# Patient Record
Sex: FEMALE | Race: WHITE | NOT HISPANIC OR LATINO | Employment: STUDENT | URBAN - METROPOLITAN AREA
[De-identification: names, ages, dates, MRNs, and addresses within clinical notes are randomized per-mention and may not be internally consistent; named-entity substitution may affect disease eponyms.]

---
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
## 2022-03-15 ENCOUNTER — TELEPHONE (OUTPATIENT)
Dept: FAMILY MEDICINE | Facility: CLINIC | Age: 22
End: 2022-03-15
Payer: COMMERCIAL

## 2022-03-15 NOTE — TELEPHONE ENCOUNTER
----- Message from Sheela Damon MA sent at 3/15/2022  9:35 AM CDT -----  Type: Needs Medical Advice  Who Called:  Paolo Garcia    Best Call Back Number: [phone]  Additional Information: pt is Dr Jayson Carroll' niece--he recommended that pt see Dr Gallo--Dr Carroll' wife, Adriana sees Dr Gallo as well & raves about her--mother would like to see if Dr Gallo would take her on as a New Patient since Dr Carroll is in the [facility] network & gave recommendation?please advise--thank you

## 2022-03-25 ENCOUNTER — OFFICE VISIT (OUTPATIENT)
Dept: FAMILY MEDICINE | Facility: CLINIC | Age: 22
End: 2022-03-25
Payer: COMMERCIAL

## 2022-03-25 VITALS
WEIGHT: 141.13 LBS | HEART RATE: 64 BPM | OXYGEN SATURATION: 99 % | DIASTOLIC BLOOD PRESSURE: 64 MMHG | HEIGHT: 67 IN | RESPIRATION RATE: 18 BRPM | BODY MASS INDEX: 22.15 KG/M2 | SYSTOLIC BLOOD PRESSURE: 96 MMHG

## 2022-03-25 DIAGNOSIS — Z00.00 ROUTINE GENERAL MEDICAL EXAMINATION AT A HEALTH CARE FACILITY: Primary | ICD-10-CM

## 2022-03-25 DIAGNOSIS — F41.9 ANXIETY: ICD-10-CM

## 2022-03-25 PROCEDURE — 1160F PR REVIEW ALL MEDS BY PRESCRIBER/CLIN PHARMACIST DOCUMENTED: ICD-10-PCS | Mod: CPTII,S$GLB,, | Performed by: FAMILY MEDICINE

## 2022-03-25 PROCEDURE — 3078F DIAST BP <80 MM HG: CPT | Mod: CPTII,S$GLB,, | Performed by: FAMILY MEDICINE

## 2022-03-25 PROCEDURE — 99999 PR PBB SHADOW E&M-EST. PATIENT-LVL IV: CPT | Mod: PBBFAC,,, | Performed by: FAMILY MEDICINE

## 2022-03-25 PROCEDURE — 3074F PR MOST RECENT SYSTOLIC BLOOD PRESSURE < 130 MM HG: ICD-10-PCS | Mod: CPTII,S$GLB,, | Performed by: FAMILY MEDICINE

## 2022-03-25 PROCEDURE — 99203 PR OFFICE/OUTPT VISIT, NEW, LEVL III, 30-44 MIN: ICD-10-PCS | Mod: S$GLB,,, | Performed by: FAMILY MEDICINE

## 2022-03-25 PROCEDURE — 99203 OFFICE O/P NEW LOW 30 MIN: CPT | Mod: S$GLB,,, | Performed by: FAMILY MEDICINE

## 2022-03-25 PROCEDURE — 3008F BODY MASS INDEX DOCD: CPT | Mod: CPTII,S$GLB,, | Performed by: FAMILY MEDICINE

## 2022-03-25 PROCEDURE — 3074F SYST BP LT 130 MM HG: CPT | Mod: CPTII,S$GLB,, | Performed by: FAMILY MEDICINE

## 2022-03-25 PROCEDURE — 3008F PR BODY MASS INDEX (BMI) DOCUMENTED: ICD-10-PCS | Mod: CPTII,S$GLB,, | Performed by: FAMILY MEDICINE

## 2022-03-25 PROCEDURE — 3078F PR MOST RECENT DIASTOLIC BLOOD PRESSURE < 80 MM HG: ICD-10-PCS | Mod: CPTII,S$GLB,, | Performed by: FAMILY MEDICINE

## 2022-03-25 PROCEDURE — 1159F PR MEDICATION LIST DOCUMENTED IN MEDICAL RECORD: ICD-10-PCS | Mod: CPTII,S$GLB,, | Performed by: FAMILY MEDICINE

## 2022-03-25 PROCEDURE — 1160F RVW MEDS BY RX/DR IN RCRD: CPT | Mod: CPTII,S$GLB,, | Performed by: FAMILY MEDICINE

## 2022-03-25 PROCEDURE — 99999 PR PBB SHADOW E&M-EST. PATIENT-LVL IV: ICD-10-PCS | Mod: PBBFAC,,, | Performed by: FAMILY MEDICINE

## 2022-03-25 PROCEDURE — 1159F MED LIST DOCD IN RCRD: CPT | Mod: CPTII,S$GLB,, | Performed by: FAMILY MEDICINE

## 2022-03-25 RX ORDER — KETOCONAZOLE 20 MG/G
1 CREAM TOPICAL 2 TIMES DAILY
COMMUNITY
Start: 2021-11-03

## 2022-03-25 RX ORDER — TAZAROTENE 0.45 MG/G
LOTION TOPICAL
COMMUNITY
Start: 2021-11-04

## 2022-03-25 RX ORDER — DOXYCYCLINE HYCLATE 200 MG/1
TABLET, DELAYED RELEASE ORAL
COMMUNITY
Start: 2021-12-27

## 2022-03-25 RX ORDER — KETOCONAZOLE 20 MG/ML
SHAMPOO, SUSPENSION TOPICAL DAILY
COMMUNITY
Start: 2021-11-04

## 2022-03-25 NOTE — PROGRESS NOTES
Subjective:       Patient ID: Hiwot Talbot is a 21 y.o. female.    Chief Complaint: Fatigue (Severe fatigue has been going on for a year, sx for 4 years, anxiety and stress related to this, cycle irregular, migraines until cycle slowed down)      Hiwot Talbot is in the office to establish care.    HPI  Medical hx reviewed.  Past Medical History:   Diagnosis Date    Vision abnormalities     pt wears contacts     Recalls hx of migraines, last >1 year ago. No meds other than otcs for general occ tension HA.   Fatigue: both mental and physical. Finds herself falling asleep throughout the day. +naps throughout the day. Trouble focusing. Initially noticed this last year.    Recalls ongoing c/o fatigue since age 16. Her boss has mentioned her needing eval for ease of falling asleep.   More tired during the week, but still requiring a nap on the weekends. Naps can go on for several hours (up to 5). Nighttime rest 6-7hrs, does not feel rested when she wakes. She does get more sleep on the weekends, 30 mins to an hour more.   +restless sleep. Some vivid dreams or nightmares. No recent issues with sleep paralysis. +light snore sometimes. No change to fatigue related to cycles.  Uses caffeine to help stay awake (coffee am and mid-day). Eats 3 meals/day. (mom notes that she's a healthy eater).   They recall lab was updated 2 years ago anticipating accutane use.   Exercises near-daily. Fatigue can affect this more recently. She finds that she doesn't want to, but has more physical fatigue related to this as well. Does not affect weight-bearing exercises.    Irregular cycles, some months can have 2 cycles. Started off this way, did ok for a while, and then last year started with more irregularity. (missed period or very light).   Weight has been stable. Not yet interested in ocps.     +anxiety. In school, but not sure what she wants to do with her life. A lot of pressure on herself. Almost a full year of noticing this.  +sleep. +tearfulness.    Enjoys training her puppy.     Current Outpatient Medications:     ARAZLO 0.045 % Lotn, , Disp: , Rfl:     doxycycline hyclate (DORYX) 200 mg EC tablet, , Disp: , Rfl:     ketoconazole (NIZORAL) 2 % cream, 1 application 2 (two) times daily. Apply to rash, Disp: , Rfl:     ketoconazole (NIZORAL) 2 % shampoo, Apply topically once daily., Disp: , Rfl:     The ASCVD Risk score (Morehouse RICK Jr., et al., 2013) failed to calculate for the following reasons:    The 2013 ASCVD risk score is only valid for ages 40 to 79     No results found for: HGBA1C  No results found for: GLUF, MICROALBUR, LDLCALC, CREATININE    Review of Systems   Constitutional: Positive for fatigue. Negative for unexpected weight change.   HENT: Negative for congestion and postnasal drip.    Respiratory: Negative for cough and shortness of breath.    Cardiovascular: Negative for chest pain and palpitations.   Gastrointestinal: Negative for blood in stool, constipation, diarrhea and nausea (occ after meals).   Genitourinary: Negative for difficulty urinating, dysuria and frequency.   Musculoskeletal: Negative for arthralgias, gait problem and myalgias.   Neurological: Negative for dizziness and headaches.   Psychiatric/Behavioral: Positive for dysphoric mood and sleep disturbance. The patient is nervous/anxious.        Objective:      Physical Exam  Vitals and nursing note reviewed.   Constitutional:       General: She is not in acute distress.     Appearance: Normal appearance. She is well-developed.   HENT:      Head: Normocephalic and atraumatic.      Right Ear: Tympanic membrane and external ear normal.      Left Ear: Tympanic membrane and external ear normal.      Nose: Nose normal.      Mouth/Throat:      Pharynx: No oropharyngeal exudate.   Eyes:      Conjunctiva/sclera: Conjunctivae normal.      Pupils: Pupils are equal, round, and reactive to light.   Neck:      Thyroid: No thyromegaly.   Cardiovascular:      Rate and  Rhythm: Normal rate and regular rhythm.   Pulmonary:      Effort: Pulmonary effort is normal. No respiratory distress.      Breath sounds: Normal breath sounds. No wheezing.   Abdominal:      General: Bowel sounds are normal. There is no distension.      Palpations: Abdomen is soft. There is no mass.      Tenderness: There is no abdominal tenderness. There is no guarding or rebound.   Musculoskeletal:         General: Normal range of motion.      Cervical back: Normal range of motion and neck supple.      Right lower leg: No edema.      Left lower leg: No edema.   Lymphadenopathy:      Cervical: No cervical adenopathy.   Skin:     General: Skin is warm and dry.   Neurological:      General: No focal deficit present.      Mental Status: She is alert and oriented to person, place, and time.      Cranial Nerves: No cranial nerve deficit.   Psychiatric:         Mood and Affect: Mood normal.         Behavior: Behavior normal.             Screening recommendations appropriate to age and health status were reviewed.    Assessment & Plan:    Routine general medical examination at a health care facility  Comments:  anticipatory guidance reviewed  Orders:  -     Cancel: CBC Without Differential; Future; Expected date: 03/25/2022  -     Cancel: Comprehensive Metabolic Panel; Future; Expected date: 03/25/2022  -     Cancel: Folate; Future; Expected date: 03/25/2022  -     Cancel: Ferritin; Future; Expected date: 03/25/2022  -     Cancel: Iron and TIBC; Future; Expected date: 03/25/2022  -     Cancel: Lipid Panel; Future; Expected date: 03/25/2022  -     Cancel: TSH; Future; Expected date: 03/25/2022  -     Cancel: Vitamin B12; Future; Expected date: 03/25/2022  -     Cancel: Urinalysis, Reflex to Urine Culture Urine, Clean Catch; Future  -     Cancel: Hepatitis C Antibody; Future; Expected date: 03/25/2022  -     Cancel: HIV 1/2 Ag/Ab (4th Gen); Future; Expected date: 03/25/2022  -     HIV 1/2 Ag/Ab (4th Gen); Future; Expected  date: 03/25/2022  -     Hepatitis C Antibody; Future; Expected date: 03/25/2022  -     Vitamin B12; Future; Expected date: 03/25/2022  -     TSH; Future; Expected date: 03/25/2022  -     Lipid Panel; Future; Expected date: 03/25/2022  -     Iron and TIBC; Future; Expected date: 03/25/2022  -     Ferritin; Future; Expected date: 03/25/2022  -     Folate; Future; Expected date: 03/25/2022  -     Comprehensive Metabolic Panel; Future; Expected date: 03/25/2022  -     CBC Without Differential; Future; Expected date: 03/25/2022  -     Cancel: Urinalysis, Reflex to Urine Culture Urine, Clean Catch; Future  -     Urinalysis, Reflex to Urine Culture Urine, Clean Catch; Future    Anxiety  -     Ambulatory referral/consult to Psychology; Future; Expected date: 04/01/2022

## 2022-03-29 ENCOUNTER — PATIENT MESSAGE (OUTPATIENT)
Dept: FAMILY MEDICINE | Facility: CLINIC | Age: 22
End: 2022-03-29
Payer: COMMERCIAL

## 2022-03-29 DIAGNOSIS — R79.0 ABNORMAL IRON SATURATION: Primary | ICD-10-CM

## 2022-03-30 LAB
ALBUMIN SERPL-MCNC: 4.2 G/DL (ref 3.6–5.1)
ALBUMIN/GLOB SERPL: 1.9 (CALC) (ref 1–2.5)
ALP SERPL-CCNC: 66 U/L (ref 36–128)
ALT SERPL-CCNC: 11 U/L (ref 5–32)
AST SERPL-CCNC: 17 U/L (ref 12–32)
BILIRUB SERPL-MCNC: 0.6 MG/DL (ref 0.2–1.1)
BUN SERPL-MCNC: 14 MG/DL (ref 7–20)
BUN/CREAT SERPL: NORMAL (CALC) (ref 6–22)
CALCIUM SERPL-MCNC: 9.3 MG/DL (ref 8.9–10.4)
CHLORIDE SERPL-SCNC: 106 MMOL/L (ref 98–110)
CHOLEST SERPL-MCNC: 119 MG/DL
CHOLEST/HDLC SERPL: 2.2 (CALC)
CO2 SERPL-SCNC: 27 MMOL/L (ref 20–32)
CREAT SERPL-MCNC: 0.64 MG/DL (ref 0.5–1)
ERYTHROCYTE [DISTWIDTH] IN BLOOD BY AUTOMATED COUNT: 11.9 % (ref 11–15)
FERRITIN SERPL-MCNC: 30 NG/ML (ref 16–154)
FOLATE SERPL-MCNC: 15.2 NG/ML
GLOBULIN SER CALC-MCNC: 2.2 G/DL (CALC) (ref 2–3.8)
GLUCOSE SERPL-MCNC: 82 MG/DL (ref 65–99)
HCT VFR BLD AUTO: 40.9 % (ref 35–45)
HCV AB S/CO SERPL IA: 0.01
HCV AB SERPL QL IA: NORMAL
HDLC SERPL-MCNC: 55 MG/DL
HGB BLD-MCNC: 13.2 G/DL (ref 11.7–15.5)
HIV 1+2 AB+HIV1 P24 AG SERPL QL IA: NORMAL
IRON SATN MFR SERPL: 58 % (CALC) (ref 15–45)
IRON SERPL-MCNC: 161 MCG/DL (ref 27–164)
LDLC SERPL CALC-MCNC: 54 MG/DL (CALC)
MCH RBC QN AUTO: 30.9 PG (ref 27–33)
MCHC RBC AUTO-ENTMCNC: 32.3 G/DL (ref 32–36)
MCV RBC AUTO: 95.8 FL (ref 80–100)
NONHDLC SERPL-MCNC: 64 MG/DL (CALC)
PLATELET # BLD AUTO: 152 THOUSAND/UL (ref 140–400)
PMV BLD REES-ECKER: 12.2 FL (ref 7.5–12.5)
POTASSIUM SERPL-SCNC: 4.6 MMOL/L (ref 3.8–5.1)
PROT SERPL-MCNC: 6.4 G/DL (ref 6.3–8.2)
RBC # BLD AUTO: 4.27 MILLION/UL (ref 3.8–5.1)
SODIUM SERPL-SCNC: 141 MMOL/L (ref 135–146)
TIBC SERPL-MCNC: 277 MCG/DL (CALC) (ref 271–448)
TRIGL SERPL-MCNC: 35 MG/DL
TSH SERPL-ACNC: 1.11 MIU/L
VIT B12 SERPL-MCNC: 472 PG/ML (ref 200–1100)
WBC # BLD AUTO: 5.9 THOUSAND/UL (ref 3.8–10.8)

## 2022-04-29 ENCOUNTER — TELEPHONE (OUTPATIENT)
Dept: FAMILY MEDICINE | Facility: CLINIC | Age: 22
End: 2022-04-29
Payer: COMMERCIAL

## 2022-04-29 NOTE — TELEPHONE ENCOUNTER
Karen Piedra Staff  Caller: Unspecified (Today,  2:22 PM)  Patient is at [facility] to retake her labs.  They do not have the orders.  Will you fax them over she is waiting there.  Patient's call back number is [phone].  Thank you!

## 2022-05-03 ENCOUNTER — PATIENT MESSAGE (OUTPATIENT)
Dept: FAMILY MEDICINE | Facility: CLINIC | Age: 22
End: 2022-05-03
Payer: COMMERCIAL

## 2022-05-03 ENCOUNTER — TELEPHONE (OUTPATIENT)
Dept: FAMILY MEDICINE | Facility: CLINIC | Age: 22
End: 2022-05-03
Payer: COMMERCIAL

## 2022-05-03 DIAGNOSIS — E83.19 IRON EXCESS: Primary | ICD-10-CM

## 2022-05-03 NOTE — TELEPHONE ENCOUNTER
Iron count has remained elevated. Recommend an appt in hematology for further evaluation into why she's storing iron so readily.

## 2022-05-04 ENCOUNTER — TELEPHONE (OUTPATIENT)
Dept: HEMATOLOGY/ONCOLOGY | Facility: CLINIC | Age: 22
End: 2022-05-04
Payer: COMMERCIAL

## 2022-05-04 LAB
BASOPHILS # BLD AUTO: 50 CELLS/UL (ref 0–200)
BASOPHILS NFR BLD AUTO: 0.9 %
EOSINOPHIL # BLD AUTO: 110 CELLS/UL (ref 15–500)
EOSINOPHIL NFR BLD AUTO: 2 %
ERYTHROCYTE [DISTWIDTH] IN BLOOD BY AUTOMATED COUNT: 11.5 % (ref 11–15)
FERRITIN SERPL-MCNC: 41 NG/ML (ref 16–154)
HCT VFR BLD AUTO: 40.8 % (ref 35–45)
HGB BLD-MCNC: 13.5 G/DL (ref 11.7–15.5)
IRON SATN MFR SERPL: 58 % (CALC) (ref 15–45)
IRON SERPL-MCNC: 174 MCG/DL (ref 27–164)
LYMPHOCYTES # BLD AUTO: 1672 CELLS/UL (ref 850–3900)
LYMPHOCYTES NFR BLD AUTO: 30.4 %
MCH RBC QN AUTO: 31.5 PG (ref 27–33)
MCHC RBC AUTO-ENTMCNC: 33.1 G/DL (ref 32–36)
MCV RBC AUTO: 95.1 FL (ref 80–100)
MONOCYTES # BLD AUTO: 451 CELLS/UL (ref 200–950)
MONOCYTES NFR BLD AUTO: 8.2 %
NEUTROPHILS # BLD AUTO: 3218 CELLS/UL (ref 1500–7800)
NEUTROPHILS NFR BLD AUTO: 58.5 %
PATH REPORT.FINAL DX SPEC: NORMAL
PATH REV BLD -IMP: NORMAL
PATHOLOGIST NAME: NORMAL
PLATELET # BLD AUTO: 165 THOUSAND/UL (ref 140–400)
PMV BLD REES-ECKER: 12.5 FL (ref 7.5–12.5)
RBC # BLD AUTO: 4.29 MILLION/UL (ref 3.8–5.1)
SPECIMEN SOURCE: NORMAL
TIBC SERPL-MCNC: 299 MCG/DL (CALC) (ref 271–448)
WBC # BLD AUTO: 5.5 THOUSAND/UL (ref 3.8–10.8)

## 2022-05-18 ENCOUNTER — TELEPHONE (OUTPATIENT)
Dept: HEMATOLOGY/ONCOLOGY | Facility: CLINIC | Age: 22
End: 2022-05-18
Payer: COMMERCIAL

## 2022-05-18 ENCOUNTER — PATIENT MESSAGE (OUTPATIENT)
Dept: FAMILY MEDICINE | Facility: CLINIC | Age: 22
End: 2022-05-18
Payer: COMMERCIAL

## 2022-05-18 ENCOUNTER — LAB VISIT (OUTPATIENT)
Dept: LAB | Facility: HOSPITAL | Age: 22
End: 2022-05-18
Attending: STUDENT IN AN ORGANIZED HEALTH CARE EDUCATION/TRAINING PROGRAM
Payer: COMMERCIAL

## 2022-05-18 ENCOUNTER — OFFICE VISIT (OUTPATIENT)
Dept: HEMATOLOGY/ONCOLOGY | Facility: CLINIC | Age: 22
End: 2022-05-18
Payer: COMMERCIAL

## 2022-05-18 VITALS
DIASTOLIC BLOOD PRESSURE: 57 MMHG | TEMPERATURE: 98 F | WEIGHT: 143.94 LBS | HEIGHT: 68 IN | RESPIRATION RATE: 18 BRPM | BODY MASS INDEX: 21.81 KG/M2 | HEART RATE: 95 BPM | OXYGEN SATURATION: 98 % | SYSTOLIC BLOOD PRESSURE: 117 MMHG

## 2022-05-18 DIAGNOSIS — E83.19 IRON EXCESS: Primary | ICD-10-CM

## 2022-05-18 DIAGNOSIS — E83.19 IRON EXCESS: ICD-10-CM

## 2022-05-18 DIAGNOSIS — R53.83 FATIGUE, UNSPECIFIED TYPE: ICD-10-CM

## 2022-05-18 LAB
BASOPHILS # BLD AUTO: 0.03 K/UL (ref 0–0.2)
BASOPHILS NFR BLD: 0.5 % (ref 0–1.9)
DIFFERENTIAL METHOD: ABNORMAL
EOSINOPHIL # BLD AUTO: 0.1 K/UL (ref 0–0.5)
EOSINOPHIL NFR BLD: 2 % (ref 0–8)
ERYTHROCYTE [DISTWIDTH] IN BLOOD BY AUTOMATED COUNT: 12 % (ref 11.5–14.5)
FERRITIN SERPL-MCNC: 35 NG/ML (ref 20–300)
HCT VFR BLD AUTO: 42.1 % (ref 37–48.5)
HGB BLD-MCNC: 13.8 G/DL (ref 12–16)
IMM GRANULOCYTES # BLD AUTO: 0.01 K/UL (ref 0–0.04)
IMM GRANULOCYTES NFR BLD AUTO: 0.2 % (ref 0–0.5)
IRON SERPL-MCNC: 83 UG/DL (ref 30–160)
LYMPHOCYTES # BLD AUTO: 0.6 K/UL (ref 1–4.8)
LYMPHOCYTES NFR BLD: 9.4 % (ref 18–48)
MCH RBC QN AUTO: 30.7 PG (ref 27–31)
MCHC RBC AUTO-ENTMCNC: 32.8 G/DL (ref 32–36)
MCV RBC AUTO: 94 FL (ref 82–98)
MONOCYTES # BLD AUTO: 0.5 K/UL (ref 0.3–1)
MONOCYTES NFR BLD: 7.8 % (ref 4–15)
NEUTROPHILS # BLD AUTO: 4.7 K/UL (ref 1.8–7.7)
NEUTROPHILS NFR BLD: 80.1 % (ref 38–73)
NRBC BLD-RTO: 0 /100 WBC
PLATELET # BLD AUTO: 124 K/UL (ref 150–450)
PMV BLD AUTO: 11 FL (ref 9.2–12.9)
RBC # BLD AUTO: 4.5 M/UL (ref 4–5.4)
SATURATED IRON: 25 % (ref 20–50)
TOTAL IRON BINDING CAPACITY: 329 UG/DL (ref 250–450)
TRANSFERRIN SERPL-MCNC: 222 MG/DL (ref 200–375)
WBC # BLD AUTO: 5.87 K/UL (ref 3.9–12.7)

## 2022-05-18 PROCEDURE — 84466 ASSAY OF TRANSFERRIN: CPT | Performed by: STUDENT IN AN ORGANIZED HEALTH CARE EDUCATION/TRAINING PROGRAM

## 2022-05-18 PROCEDURE — 3078F DIAST BP <80 MM HG: CPT | Mod: CPTII,S$GLB,, | Performed by: STUDENT IN AN ORGANIZED HEALTH CARE EDUCATION/TRAINING PROGRAM

## 2022-05-18 PROCEDURE — 1160F PR REVIEW ALL MEDS BY PRESCRIBER/CLIN PHARMACIST DOCUMENTED: ICD-10-PCS | Mod: CPTII,S$GLB,, | Performed by: STUDENT IN AN ORGANIZED HEALTH CARE EDUCATION/TRAINING PROGRAM

## 2022-05-18 PROCEDURE — 3074F PR MOST RECENT SYSTOLIC BLOOD PRESSURE < 130 MM HG: ICD-10-PCS | Mod: CPTII,S$GLB,, | Performed by: STUDENT IN AN ORGANIZED HEALTH CARE EDUCATION/TRAINING PROGRAM

## 2022-05-18 PROCEDURE — 85025 COMPLETE CBC W/AUTO DIFF WBC: CPT | Mod: PN | Performed by: STUDENT IN AN ORGANIZED HEALTH CARE EDUCATION/TRAINING PROGRAM

## 2022-05-18 PROCEDURE — 82728 ASSAY OF FERRITIN: CPT | Performed by: STUDENT IN AN ORGANIZED HEALTH CARE EDUCATION/TRAINING PROGRAM

## 2022-05-18 PROCEDURE — 99999 PR PBB SHADOW E&M-EST. PATIENT-LVL IV: CPT | Mod: PBBFAC,,, | Performed by: STUDENT IN AN ORGANIZED HEALTH CARE EDUCATION/TRAINING PROGRAM

## 2022-05-18 PROCEDURE — 3008F PR BODY MASS INDEX (BMI) DOCUMENTED: ICD-10-PCS | Mod: CPTII,S$GLB,, | Performed by: STUDENT IN AN ORGANIZED HEALTH CARE EDUCATION/TRAINING PROGRAM

## 2022-05-18 PROCEDURE — 3008F BODY MASS INDEX DOCD: CPT | Mod: CPTII,S$GLB,, | Performed by: STUDENT IN AN ORGANIZED HEALTH CARE EDUCATION/TRAINING PROGRAM

## 2022-05-18 PROCEDURE — 3078F PR MOST RECENT DIASTOLIC BLOOD PRESSURE < 80 MM HG: ICD-10-PCS | Mod: CPTII,S$GLB,, | Performed by: STUDENT IN AN ORGANIZED HEALTH CARE EDUCATION/TRAINING PROGRAM

## 2022-05-18 PROCEDURE — 1159F MED LIST DOCD IN RCRD: CPT | Mod: CPTII,S$GLB,, | Performed by: STUDENT IN AN ORGANIZED HEALTH CARE EDUCATION/TRAINING PROGRAM

## 2022-05-18 PROCEDURE — 1160F RVW MEDS BY RX/DR IN RCRD: CPT | Mod: CPTII,S$GLB,, | Performed by: STUDENT IN AN ORGANIZED HEALTH CARE EDUCATION/TRAINING PROGRAM

## 2022-05-18 PROCEDURE — 81256 HFE GENE: CPT | Performed by: STUDENT IN AN ORGANIZED HEALTH CARE EDUCATION/TRAINING PROGRAM

## 2022-05-18 PROCEDURE — 36415 COLL VENOUS BLD VENIPUNCTURE: CPT | Mod: PN | Performed by: STUDENT IN AN ORGANIZED HEALTH CARE EDUCATION/TRAINING PROGRAM

## 2022-05-18 PROCEDURE — 3074F SYST BP LT 130 MM HG: CPT | Mod: CPTII,S$GLB,, | Performed by: STUDENT IN AN ORGANIZED HEALTH CARE EDUCATION/TRAINING PROGRAM

## 2022-05-18 PROCEDURE — 99204 OFFICE O/P NEW MOD 45 MIN: CPT | Mod: S$GLB,,, | Performed by: STUDENT IN AN ORGANIZED HEALTH CARE EDUCATION/TRAINING PROGRAM

## 2022-05-18 PROCEDURE — 1159F PR MEDICATION LIST DOCUMENTED IN MEDICAL RECORD: ICD-10-PCS | Mod: CPTII,S$GLB,, | Performed by: STUDENT IN AN ORGANIZED HEALTH CARE EDUCATION/TRAINING PROGRAM

## 2022-05-18 PROCEDURE — 99999 PR PBB SHADOW E&M-EST. PATIENT-LVL IV: ICD-10-PCS | Mod: PBBFAC,,, | Performed by: STUDENT IN AN ORGANIZED HEALTH CARE EDUCATION/TRAINING PROGRAM

## 2022-05-18 PROCEDURE — 99204 PR OFFICE/OUTPT VISIT, NEW, LEVL IV, 45-59 MIN: ICD-10-PCS | Mod: S$GLB,,, | Performed by: STUDENT IN AN ORGANIZED HEALTH CARE EDUCATION/TRAINING PROGRAM

## 2022-05-18 NOTE — TELEPHONE ENCOUNTER
Dr Anderson will be made aware .    ----- Message from Jayla Mckeon sent at 5/18/2022 12:20 PM CDT -----  Type: Needs Medical Advice  Who Called:  Patient   Symptoms (please be specific):    How long has patient had these symptoms:    Pharmacy name and phone #:    Best Call Back Number: [phone]  Additional Information: Patient called to notify the office that she will be about 10-15 min late for appt.   Patient would like a call back if she needs to reschedule.

## 2022-05-18 NOTE — PROGRESS NOTES
Subjective:                                                                                    Consult note   Patient ID:    Name: Hiwot Talbot  : 2000  MRN: [id number]      HPI:   Hiwot Talbot is a 21 y.o. female presents for evaluation of elevated iron saturation    Patient was referred to us for a concern due to elevation in her iron saturation and iron levels on blood work 2 weeks ago by her PCP Dr Gallo. On further discussion with patient, seems she is being having significant chronic fatigued since at age of 16, worsening in the past couple of months. Does have heavy periods, no chest pain, sob, weight loss or decrease appetite. She does however express depression symptoms and has an nova with Dr Bejarano on 22. On discussion about sleep hygiene, no snoring or waking up in the middle of the night.     Past Medical History:   Diagnosis Date    Anemia     Vision abnormalities     pt wears contacts       History reviewed. No pertinent surgical history.    History reviewed. No pertinent family history.    Social History     Socioeconomic History    Marital status: Single   Tobacco Use    Smoking status: Never Smoker    Smokeless tobacco: Never Used   Substance and Sexual Activity    Alcohol use: No    Drug use: No    Sexual activity: Never       Review of patient's allergies indicates:  No Known Allergies    Review of Systems   Constitutional: Positive for malaise/fatigue. Negative for decreased appetite, fever, night sweats and weight loss.   Eyes: Negative for blurred vision, double vision, pain and visual disturbance.   Cardiovascular: Negative for chest pain.   Respiratory: Negative for cough and shortness of breath.    Hematologic/Lymphatic: Negative for adenopathy and bleeding problem. Does not bruise/bleed easily.   Skin: Negative for rash.   Musculoskeletal: Negative for back pain, joint swelling, muscle cramps, muscle weakness and neck pain.   Gastrointestinal: Negative for abdominal  "pain and diarrhea.   Genitourinary: Negative for frequency.   Neurological: Positive for weakness. Negative for dizziness, focal weakness and headaches.   Psychiatric/Behavioral: The patient does not have insomnia and is not nervous/anxious.             Objective:     Vitals:    05/18/22 1324   BP: (!) 117/57   BP Location: Right arm   Patient Position: Sitting   BP Method: Medium (Manual)   Pulse: 95   Resp: 18   Temp: 97.9 °F (36.6 °C)   TempSrc: Temporal   SpO2: 98%   Weight: 65.3 kg (143 lb 15.4 oz)   Height: 5' 8" (1.727 m)        Physical Exam  Constitutional:       General: She is not in acute distress.     Appearance: Normal appearance. She is normal weight.   HENT:      Head: Normocephalic and atraumatic.   Eyes:      General: No scleral icterus.  Cardiovascular:      Rate and Rhythm: Normal rate and regular rhythm.      Heart sounds: Normal heart sounds.   Pulmonary:      Effort: Pulmonary effort is normal.      Breath sounds: Normal breath sounds. No wheezing.   Chest:      Chest wall: No tenderness.   Abdominal:      General: Bowel sounds are normal. There is no distension.      Palpations: Abdomen is soft. There is no mass.   Musculoskeletal:         General: No swelling or tenderness.      Cervical back: No tenderness.   Lymphadenopathy:      Cervical: No cervical adenopathy.   Skin:     General: Skin is warm.      Coloration: Skin is not jaundiced or pale.   Neurological:      General: No focal deficit present.      Mental Status: She is oriented to person, place, and time.   Psychiatric:         Mood and Affect: Mood normal.         Behavior: Behavior normal.           Current Outpatient Medications on File Prior to Visit   Medication Sig    ARAZLO 0.045 % Lotn     doxycycline hyclate (DORYX) 200 mg EC tablet     ketoconazole (NIZORAL) 2 % cream 1 application 2 (two) times daily. Apply to rash    ketoconazole (NIZORAL) 2 % shampoo Apply topically once daily.     No current facility-administered " medications on file prior to visit.       CBC:  Lab Results   Component Value Date    WBC 5.87 05/18/2022    HGB 13.8 05/18/2022    HCT 42.1 05/18/2022    MCV 94 05/18/2022     (L) 05/18/2022       CMP:  Sodium   Date Value Ref Range Status   03/28/2022 141 135 - 146 mmol/L Final     Potassium   Date Value Ref Range Status   03/28/2022 4.6 3.8 - 5.1 mmol/L Final     Chloride   Date Value Ref Range Status   03/28/2022 106 98 - 110 mmol/L Final     CO2   Date Value Ref Range Status   03/28/2022 27 20 - 32 mmol/L Final     Glucose   Date Value Ref Range Status   03/28/2022 82 65 - 99 mg/dL Final     Comment:                   Fasting reference interval          BUN   Date Value Ref Range Status   03/28/2022 14 7 - 20 mg/dL Final     Creatinine   Date Value Ref Range Status   03/28/2022 0.64 0.50 - 1.00 mg/dL Final     Calcium   Date Value Ref Range Status   03/28/2022 9.3 8.9 - 10.4 mg/dL Final     Total Protein   Date Value Ref Range Status   03/28/2022 6.4 6.3 - 8.2 g/dL Final     Albumin   Date Value Ref Range Status   03/28/2022 4.2 3.6 - 5.1 g/dL Final     Total Bilirubin   Date Value Ref Range Status   03/28/2022 0.6 0.2 - 1.1 mg/dL Final     AST   Date Value Ref Range Status   03/28/2022 17 12 - 32 U/L Final     ALT   Date Value Ref Range Status   03/28/2022 11 5 - 32 U/L Final     eGFR if    Date Value Ref Range Status   03/28/2022 150 > OR = 60 mL/min/1.73m2 Final     eGFR if non    Date Value Ref Range Status   03/28/2022 129 > OR = 60 mL/min/1.73m2 Final       Lab Results   Component Value Date    IRON 83 05/18/2022    TIBC 329 05/18/2022    FERRITIN 35 05/18/2022       Lab Results   Component Value Date    HDQBNXCG20 472 03/28/2022   ,  Lab Results   Component Value Date    FOLATE 15.2 03/28/2022       No image results found.       All pertinent labs and imaging reviewed.    Assessment:       1. Fatigue, unspecified type    2. Iron excess      # Iron excess:  - noted  in increase in her iron saturation with fatigued patient was referred for further work up  - Ferritin wnl and actually in the low normal level. Patient is having heavy periods but eat a very good healthy diet  - explain that the blood results less likely the etiology for the fatigued especially with no anemia   - will send out for hemochromatosis to rule it out and if its negative patient can cont follow up with her PCP  - repeating blood work here with normal iron saturation and iron level.     # Fatigued:  - chronic, possible due to depressions, will see Dr Bejarano on 6/7/22.    Plan:     Fatigue, unspecified type    Iron excess  -     Ambulatory referral/consult to Hematology / Oncology  -     HEMOCHROMATOSIS DNA ANALYSIS (PCR); Future; Expected date: 05/18/2022  -     CBC w/ DIFF; Future; Expected date: 05/18/2022  -     Iron and TIBC; Future; Expected date: 05/18/2022  -     Ferritin; Future; Expected date: 05/18/2022       Patient queried and all questions were answered.      Recommend COVID guidelines being followed   Recommend  exercise, nutrition and weight management and health maintenance activities and follow-up with PCPs recommendations     Signed:  Padmaja Anderson MD  Hematology and Oncology  [facility]  [facility] [place] [facility]    Route Chart for Scheduling    Med Onc Chart Routing      Follow up with physician No follow up needed. Blood work today    Follow up with SRINATH    Labs    Imaging    Pharmacy appointment    Other referrals

## 2022-05-19 ENCOUNTER — PATIENT MESSAGE (OUTPATIENT)
Dept: HEMATOLOGY/ONCOLOGY | Facility: CLINIC | Age: 22
End: 2022-05-19
Payer: COMMERCIAL

## 2022-05-19 PROBLEM — E83.19 IRON EXCESS: Status: ACTIVE | Noted: 2022-05-19

## 2022-05-19 PROBLEM — R53.83 FATIGUE: Status: ACTIVE | Noted: 2022-05-19

## 2022-05-20 ENCOUNTER — OFFICE VISIT (OUTPATIENT)
Dept: FAMILY MEDICINE | Facility: CLINIC | Age: 22
End: 2022-05-20
Payer: COMMERCIAL

## 2022-05-20 DIAGNOSIS — R53.83 FATIGUE, UNSPECIFIED TYPE: ICD-10-CM

## 2022-05-20 DIAGNOSIS — F41.9 ANXIETY: ICD-10-CM

## 2022-05-20 DIAGNOSIS — E83.19 IRON EXCESS: Primary | ICD-10-CM

## 2022-05-20 PROCEDURE — 1159F PR MEDICATION LIST DOCUMENTED IN MEDICAL RECORD: ICD-10-PCS | Mod: CPTII,95,, | Performed by: FAMILY MEDICINE

## 2022-05-20 PROCEDURE — 99213 PR OFFICE/OUTPT VISIT, EST, LEVL III, 20-29 MIN: ICD-10-PCS | Mod: 95,,, | Performed by: FAMILY MEDICINE

## 2022-05-20 PROCEDURE — 1160F RVW MEDS BY RX/DR IN RCRD: CPT | Mod: CPTII,95,, | Performed by: FAMILY MEDICINE

## 2022-05-20 PROCEDURE — 1160F PR REVIEW ALL MEDS BY PRESCRIBER/CLIN PHARMACIST DOCUMENTED: ICD-10-PCS | Mod: CPTII,95,, | Performed by: FAMILY MEDICINE

## 2022-05-20 PROCEDURE — 99213 OFFICE O/P EST LOW 20 MIN: CPT | Mod: 95,,, | Performed by: FAMILY MEDICINE

## 2022-05-20 PROCEDURE — 1159F MED LIST DOCD IN RCRD: CPT | Mod: CPTII,95,, | Performed by: FAMILY MEDICINE

## 2022-05-20 NOTE — PROGRESS NOTES
Subjective:       Patient ID: Hiwot Talbot is a 21 y.o. female.    Chief Complaint: Follow-up      The patient location is: [place]  The chief complaint leading to consultation is: f/u  Visit type: Virtual visit with synchronous audio and video  Total time spent with patient: 10mins  Each patient to whom he or she provides medical services by telemedicine is:  (1) informed of the relationship between the physician and patient and the respective role of any other health care provider with respect to management of the patient; and (2) notified that he or she may decline to receive medical services by telemedicine and may withdraw from such care at any time.    Notes: patient seen for f/u.   She's been ok of late. Still feels tired, low motivation, no significant change from previous.  She's feeling more achy and tired. Weight is stable/maybe up a few lbs.   Slight headaches.     Review of Systems   Constitutional: Positive for activity change. Negative for unexpected weight change.   HENT: Positive for rhinorrhea. Negative for hearing loss and trouble swallowing.    Eyes: Negative for discharge and visual disturbance.   Respiratory: Negative for chest tightness and wheezing.    Cardiovascular: Negative for chest pain and palpitations.   Gastrointestinal: Negative for blood in stool, constipation, diarrhea and vomiting.   Endocrine: Negative for polydipsia and polyuria.   Genitourinary: Negative for difficulty urinating, dysuria, hematuria and menstrual problem.   Musculoskeletal: Positive for arthralgias. Negative for joint swelling and neck pain.   Neurological: Positive for weakness and headaches.   Psychiatric/Behavioral: Positive for dysphoric mood. Negative for confusion.       Objective:        Physical Exam  Vitals and nursing note reviewed.   Constitutional:       General: She is not in acute distress.     Appearance: She is well-developed.   HENT:      Head: Normocephalic and atraumatic.   Eyes:      General:  No scleral icterus.        Right eye: No discharge.         Left eye: No discharge.      Conjunctiva/sclera: Conjunctivae normal.   Pulmonary:      Effort: Pulmonary effort is normal. No respiratory distress.   Skin:     General: Skin is warm and dry.   Neurological:      General: No focal deficit present.      Mental Status: She is alert and oriented to person, place, and time.   Psychiatric:         Mood and Affect: Mood normal.         Behavior: Behavior normal.             Assessment:   Iron excess  Comments:  keep f/u with hematology  hemochromatosis dna in process  most recent level was normal    Fatigue, unspecified type  -     ALEJANDRA Screen w/Reflex; Future; Expected date: 05/20/2022  -     Cyclic Citrullinated Peptide Antibody, IgG; Future; Expected date: 05/20/2022  -     C-Reactive Protein; Future; Expected date: 05/20/2022  -     Rheumatoid Factor; Future; Expected date: 05/20/2022  -     Sedimentation rate; Future; Expected date: 05/20/2022    Anxiety  Comments:  keep upcoming appt with psych for review of anxiety    let me know if isinus sx persist.    Patient aware of limitations to assessment and exam of telemedicine. Deemed appropriate at this time given current covid-19 concerns.

## 2022-05-23 ENCOUNTER — PATIENT MESSAGE (OUTPATIENT)
Dept: FAMILY MEDICINE | Facility: CLINIC | Age: 22
End: 2022-05-23
Payer: COMMERCIAL

## 2022-05-26 LAB
GENETICIST REVIEW: NORMAL
HFE GENE MUT ANL BLD/T: NORMAL
HFE RELEASED BY: NORMAL
HFE RESULT SUMMARY: NEGATIVE
REF LAB TEST METHOD: NORMAL
SPECIMEN SOURCE: NORMAL
SPECIMEN,  HEMOCHROMATOSIS: NORMAL

## 2022-06-07 ENCOUNTER — OFFICE VISIT (OUTPATIENT)
Dept: PSYCHIATRY | Facility: CLINIC | Age: 22
End: 2022-06-07
Payer: COMMERCIAL

## 2022-06-07 DIAGNOSIS — F32.0 MDD (MAJOR DEPRESSIVE DISORDER), SINGLE EPISODE, MILD: Primary | ICD-10-CM

## 2022-06-07 DIAGNOSIS — F41.1 GAD (GENERALIZED ANXIETY DISORDER): ICD-10-CM

## 2022-06-07 PROCEDURE — 99999 PR PBB SHADOW E&M-EST. PATIENT-LVL I: ICD-10-PCS | Mod: PBBFAC,,, | Performed by: PSYCHOLOGIST

## 2022-06-07 PROCEDURE — 99999 PR PBB SHADOW E&M-EST. PATIENT-LVL I: CPT | Mod: PBBFAC,,, | Performed by: PSYCHOLOGIST

## 2022-06-07 PROCEDURE — 90791 PR PSYCHIATRIC DIAGNOSTIC EVALUATION: ICD-10-PCS | Mod: S$GLB,,, | Performed by: PSYCHOLOGIST

## 2022-06-07 PROCEDURE — 90791 PSYCH DIAGNOSTIC EVALUATION: CPT | Mod: S$GLB,,, | Performed by: PSYCHOLOGIST

## 2022-06-07 NOTE — PROGRESS NOTES
Psychiatry Initial Visit (PhD/LCSW)  Diagnostic Interview - CPT 00248    Date: 6/7/2022    Site: [facility]    Referral source: Dorothea Gallo MD    Clinical status of patient: Outpatient    Hiwot Talbot, a 21 y.o. female, for initial evaluation visit.  Met with patient.    Chief complaint/reason for encounter: depression and anxiety    History of present illness: Patient reported symptoms of depression and anxiety for the last 10 months.  Symptoms include depressed mood, diminished interest, insomnia, fatigue, worthlessness/guilt, tearfulness and social isolation, excessive anxiety/worry, restlessness/keyed up, irritability and panic attacks.  Patient reported that she had a plan to go to college in [place] which her parents agreed to pay for, but when the time came they changed their minds which left her to attend community college.  She noted feeling stuck and having limited social support since many of her friends relocated for college.  Patient denied history of self-harm behaviors.  Patient denied current suicidal and homicidal ideations.     Pain: noncontributory    Symptoms:   · Mood: depressed mood, diminished interest, insomnia, fatigue, worthlessness/guilt, tearfulness and social isolation  · Anxiety: excessive anxiety/worry, restlessness/keyed up, irritability and panic attacks  · Substance abuse: denied  · Cognitive functioning: denied  · Health behaviors: noncontributory    Psychiatric history: none    Medical history: excess iron    Family history of psychiatric illness: Maternal Uncle - depression and anxiety, Maternal Grandfather - alcohol abuse     Social history (marriage, employment, etc.): Patient reported growing up in [place], [place] living with her mother and grandparents until her father was released from detention when she was [age] years old.  She is the oldest of three children.  She has good relationships with her two younger siblings.  She reports having a close relationship with her  "mother, but they occasionally have disagreements.  Patient reports having a good relationship with her father, but noted that they "bump heads."  She denied history of abuse and trauma during childhood.  She graduated from high school.  She is currently attending [employer] majoring in Biological Sciences.  She is currently employed as a  at a gym.  She has never been  and has no children.  She noted that many of her friends relocated for college, but she has recently become friends with coworkers.        Substance use:   Alcohol: social - twice per month up to two drinks   Drugs: none   Tobacco: none   Caffeine: Coffee - one cup daily    Current medications and drug reactions (include OTC, herbal): see medication list     Strengths and liabilities: Strength: Patient is expressive/articulate., Liability: Patient lacks coping skills.    Current Evaluation:     Mental Status Exam:  General Appearance:  unremarkable, age appropriate   Speech: normal tone, normal rate, normal pitch, normal volume      Level of Cooperation: cooperative      Thought Processes: normal and logical   Mood: anxious, depressed      Thought Content: normal, no suicidality, no homicidality, delusions, or paranoia   Affect: congruent and appropriate   Orientation: Oriented x3   Memory: recent >  words after brief delay: 3 of 3 words, remote >  intact   Attention Span & Concentration: completed serial 7s adequately   Fund of General Knowledge: intact and appropriate to age and level of education   Judgment & Insight: fair     Language  intact     Diagnostic Impression - Plan:       ICD-10-CM ICD-9-CM   1. MDD (major depressive disorder), single episode, mild  F32.0 296.21   2. LAI (generalized anxiety disorder)  F41.1 300.02       Plan:individual psychotherapy and consult psychiatrist for medication evaluation    Return to Clinic: 2 weeks    Length of Service (minutes): 45    "

## 2022-06-18 ENCOUNTER — PATIENT MESSAGE (OUTPATIENT)
Dept: FAMILY MEDICINE | Facility: CLINIC | Age: 22
End: 2022-06-18
Payer: COMMERCIAL

## 2022-07-12 LAB
CCP IGG SERPL-ACNC: <16 UNITS
CRP SERPL-MCNC: <0.2 MG/L
ERYTHROCYTE [SEDIMENTATION RATE] IN BLOOD BY WESTERGREN METHOD: 2 MM/H
RHEUMATOID FACT SERPL-ACNC: <14 IU/ML

## 2022-07-26 RX ORDER — PERMETHRIN 50 MG/G
CREAM TOPICAL
Qty: 60 G | Refills: 1 | Status: SHIPPED | OUTPATIENT
Start: 2022-07-26

## 2023-08-14 ENCOUNTER — TELEPHONE (OUTPATIENT)
Dept: FAMILY MEDICINE | Facility: CLINIC | Age: 23
End: 2023-08-14
Payer: COMMERCIAL

## 2023-08-14 NOTE — TELEPHONE ENCOUNTER
----- Message from Enriqueta Campbell, Patient Care Assistant sent at 8/14/2023 10:31 AM CDT -----  Regarding: advice  Contact: pt  Type: Needs Medical Advice    Who Called:  pt     Best Call Back Number: [phone] (home)     Additional Information: pt states she would like a callback regarding paperwork being filled out for school. Please call to advise. Thanks!

## 2023-08-14 NOTE — TELEPHONE ENCOUNTER
Attempt #3; called [facility], no answer, left message to return my call.   Spoke w/ pt. She states she is starting a new college and they told her they need paperwork for shots, but after further review, she said she can complete it and doesn't need anything urgent from us. Would just like to sched annual check up. Appt made for 120pm on 10/2/23. Pt agreed

## 2023-10-26 ENCOUNTER — OFFICE VISIT (OUTPATIENT)
Dept: URGENT CARE | Facility: CLINIC | Age: 23
End: 2023-10-26
Payer: COMMERCIAL

## 2023-10-26 VITALS
HEIGHT: 68 IN | HEART RATE: 110 BPM | BODY MASS INDEX: 21.67 KG/M2 | WEIGHT: 143 LBS | TEMPERATURE: 98 F | SYSTOLIC BLOOD PRESSURE: 125 MMHG | OXYGEN SATURATION: 98 % | DIASTOLIC BLOOD PRESSURE: 76 MMHG

## 2023-10-26 DIAGNOSIS — R00.0 TACHYCARDIA WITH HEART RATE 121-140 BEATS PER MINUTE: Primary | ICD-10-CM

## 2023-10-26 DIAGNOSIS — I10 ORTHOSTATIC HYPERTENSION: ICD-10-CM

## 2023-10-26 PROCEDURE — 99499 NO LOS: ICD-10-PCS | Mod: S$GLB,,, | Performed by: NURSE PRACTITIONER

## 2023-10-26 PROCEDURE — 99499 UNLISTED E&M SERVICE: CPT | Mod: S$GLB,,, | Performed by: NURSE PRACTITIONER

## 2023-10-26 NOTE — PATIENT INSTRUCTIONS
Your EKG was normal today.  If the chest pain increases, if you faint or any other symptoms worsen please go to Urgent Care.     Dehydration Discharge Instructions, Adult   About this topic   Dehydration happens when you lose too much water and salt. This can happen when you throw up too much or have too many loose stools. Sweating too much or passing too much urine can also cause this problem. Your body needs a certain amount of fluid to work normally. You may lose more water than you take by eating and drinking. If you cannot make up for these losses by drinking more, the doctors will need to replace the fluids you have lost.  What care is needed at home?   Ask your doctor what you need to do when you go home. Make sure you ask questions if you do not understand what the doctor says.  Drink small amounts of fluid every 15 to 30 minutes. Good fluids to drink are water, broth, sports drinks, and oral electrolyte solutions. It is also important to eat if you are able to keep food down.  Avoid beer, wine, and mixed drinks.  Check your blood sugar more often if you have high blood sugar.  If you are throwing up, try to drink if you can until you are able to eat small amounts of food.  If you cannot keep fluids down, suck on ice chips.  If you have loose stools, try to drink extra fluids to replace the water your body has lost.  If you are breastfeeding, keep feeding your baby as you normally would.  What follow-up care is needed?   Some more tests may be needed based on your condition. Your doctor may ask you to make visits to the office to check on your progress. Be sure to keep these visits.  What drugs may be needed?   The doctor may order drugs based on your condition. Take your drugs as ordered.  Will physical activity be limited?   You may feel weak if you are still dehydrated. It is best to rest until you fully recover.  What changes to diet are needed?   If you have a heart or kidney problem and you are  dehydrated, ask your doctor about how much liquid you can drink each day.  What can be done to prevent this health problem?   Drink 6 to 8 glasses of liquid throughout the day, not just when you feel thirsty. Avoid drinks with caffeine like coffee or soda pop. These may make you pass urine more often.  Eat foods high in water content such as fruits and vegetables.  When you work out or play sports, drink water 30 minutes before starting. Drink small amounts of water during workouts and keep drinking liquids after working out.  Drink more fluids when the weather is hot. Try to stay out of the heat if possible.  If you are breastfeeding, you need to drink more fluids.  When do I need to call the doctor?   You feel very weak; like you cant stand up; and your skin is cool, clammy, or looks blue or gray.  You have severe abdominal pain.  You have chest pain or trouble breathing.  You have signs of severe fluid loss, such as:  No urine for more than 8 hours.  You feel very light-headed or like you are going to pass out.  You feel weak like you are going to fall.  You are not able to keep any fluids down.  You develop early signs of fluid loss again, such as:  Your urine is very dark-colored.  Your mouth is dry.  You have muscle cramps.  You have a lack of energy.  You feel light-headed when you get up.     Teach Back: Helping You Understand   The Teach Back Method helps you understand the information we are giving you. After you talk with the staff, tell them in your own words what you learned. This helps to make sure the staff has described each thing clearly. It also helps to explain things that may have been confusing. Before going home, make sure you can do these:  I can tell you about my condition.  I can tell you how often I should try to drink fluids and good kinds of fluids to drink.  I can tell you what I will do if I have trouble keeping fluids down.  Where can I learn more?   [facility]  Choices  http://www.nhs.uk/conditions/dehydration/Pages/Introduction.aspx   Last Reviewed Date   2021-06-07  Consumer Information Use and Disclaimer   This information is not specific medical advice and does not replace information you receive from your health care provider. This is only a brief summary of general information. It does NOT include all information about conditions, illnesses, injuries, tests, procedures, treatments, therapies, discharge instructions or life-style choices that may apply to you. You must talk with your health care provider for complete information about your health and treatment options. This information should not be used to decide whether or not to accept your health care providers advice, instructions or recommendations. Only your health care provider has the knowledge and training to provide advice that is right for you.  Copyright   Copyright © 2021 [employer] and its affiliates and/or licensors. All rights reserved.

## 2023-10-26 NOTE — PROGRESS NOTES
"Subjective:      Patient ID: Hiwot Talbot is a 22 y.o. female.    Vitals:  height is 5' 8" (1.727 m) and weight is 64.9 kg (143 lb). Her temperature is 98.2 °F (36.8 °C). Her blood pressure is 125/76 and her pulse is 110. Her oxygen saturation is 98%.     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 Pt reports being in class and felt like heart was beating out her chest and she also felt light headed.     Palpitations   This is a new problem. The current episode started today. The problem has been gradually improving. On average, each episode lasts 45 minutes. Exacerbated by: walking ,tripped over feet a few times. Associated symptoms include anxiety, chest pain, dizziness and near-syncope. Pertinent negatives include no chest fullness, coughing, diaphoresis, fever, irregular heartbeat, malaise/fatigue, nausea, numbness, shortness of breath, syncope, vomiting or weakness. She has tried nothing for the symptoms. The treatment provided no relief. There are no known risk factors. There is no history of anemia, anxiety, drug use, heart disease, hyperthyroidism or a valve disorder.     Constitution: Negative for sweating and fever.   Cardiovascular:  Positive for chest pain and palpitations. Negative for passing out.   Respiratory:  Negative for cough and shortness of breath.    Gastrointestinal:  Negative for nausea and vomiting.   Neurological:  Positive for dizziness. Negative for numbness.   Psychiatric/Behavioral:  Positive for nervous/anxious. The patient is nervous/anxious.       Pt denies having dx anxiety disorder.  Objective:     Physical Exam   Constitutional: She is oriented to person, place, and time.  Non-toxic appearance. She does not appear ill. No distress.   HENT:   Head: Normocephalic and atraumatic.   Ears:   Right Ear: Tympanic membrane normal.   Left Ear: Tympanic membrane normal.   Nose: No congestion.   Mouth/Throat: Mucous membranes are moist. Oropharynx is clear.   Eyes: Conjunctivae are " normal. Pupils are equal, round, and reactive to light. Extraocular movement intact   Cardiovascular: Normal heart sounds and normal pulses. A regularly irregular rhythm present. Tachycardia present.   No murmur heard.  Pulmonary/Chest: Effort normal and breath sounds normal. No respiratory distress. She has no wheezes.   Abdominal: Normal appearance. There is no abdominal tenderness.   Musculoskeletal: Normal range of motion.         General: Normal range of motion.      Right lower leg: No edema.      Left lower leg: No edema.   Neurological: no focal deficit. She is alert and oriented to person, place, and time.   Skin: Skin is warm and not diaphoretic.   Psychiatric: Her behavior is normal. Her mood appears anxious.      Comments: Concerned about her dx and increased HR   Nursing note and vitals reviewed.    Assessment:     1. Tachycardia with heart rate 121-140 beats per minute  - IN OFFICE EKG 12-LEAD (to Muse)    2. Orthostatic hypertension       Plan:     Patient Instructions    Your EKG was normal today.  If the chest pain increases, if you faint or any other symptoms worsen please go to Urgent Care.     Dehydration Discharge Instructions, Adult   About this topic   Dehydration happens when you lose too much water and salt. This can happen when you throw up too much or have too many loose stools. Sweating too much or passing too much urine can also cause this problem. Your body needs a certain amount of fluid to work normally. You may lose more water than you take by eating and drinking. If you cannot make up for these losses by drinking more, the doctors will need to replace the fluids you have lost.  What care is needed at home?   Ask your doctor what you need to do when you go home. Make sure you ask questions if you do not understand what the doctor says.  Drink small amounts of fluid every 15 to 30 minutes. Good fluids to drink are water, broth, sports drinks, and oral electrolyte solutions. It is also  important to eat if you are able to keep food down.  Avoid beer, wine, and mixed drinks.  Check your blood sugar more often if you have high blood sugar.  If you are throwing up, try to drink if you can until you are able to eat small amounts of food.  If you cannot keep fluids down, suck on ice chips.  If you have loose stools, try to drink extra fluids to replace the water your body has lost.  If you are breastfeeding, keep feeding your baby as you normally would.  What follow-up care is needed?   Some more tests may be needed based on your condition. Your doctor may ask you to make visits to the office to check on your progress. Be sure to keep these visits.  What drugs may be needed?   The doctor may order drugs based on your condition. Take your drugs as ordered.  Will physical activity be limited?   You may feel weak if you are still dehydrated. It is best to rest until you fully recover.  What changes to diet are needed?   If you have a heart or kidney problem and you are dehydrated, ask your doctor about how much liquid you can drink each day.  What can be done to prevent this health problem?   Drink 6 to 8 glasses of liquid throughout the day, not just when you feel thirsty. Avoid drinks with caffeine like coffee or soda pop. These may make you pass urine more often.  Eat foods high in water content such as fruits and vegetables.  When you work out or play sports, drink water 30 minutes before starting. Drink small amounts of water during workouts and keep drinking liquids after working out.  Drink more fluids when the weather is hot. Try to stay out of the heat if possible.  If you are breastfeeding, you need to drink more fluids.  When do I need to call the doctor?   You feel very weak; like you cant stand up; and your skin is cool, clammy, or looks blue or gray.  You have severe abdominal pain.  You have chest pain or trouble breathing.  You have signs of severe fluid loss, such as:  No urine for more  than 8 hours.  You feel very light-headed or like you are going to pass out.  You feel weak like you are going to fall.  You are not able to keep any fluids down.  You develop early signs of fluid loss again, such as:  Your urine is very dark-colored.  Your mouth is dry.  You have muscle cramps.  You have a lack of energy.  You feel light-headed when you get up.     Teach Back: Helping You Understand   The Teach Back Method helps you understand the information we are giving you. After you talk with the staff, tell them in your own words what you learned. This helps to make sure the staff has described each thing clearly. It also helps to explain things that may have been confusing. Before going home, make sure you can do these:  I can tell you about my condition.  I can tell you how often I should try to drink fluids and good kinds of fluids to drink.  I can tell you what I will do if I have trouble keeping fluids down.  Where can I learn more?   [facility] Choices  http://www.nhs.uk/conditions/dehydration/Pages/Introduction.aspx   Last Reviewed Date   2021-06-07  Consumer Information Use and Disclaimer   This information is not specific medical advice and does not replace information you receive from your health care provider. This is only a brief summary of general information. It does NOT include all information about conditions, illnesses, injuries, tests, procedures, treatments, therapies, discharge instructions or life-style choices that may apply to you. You must talk with your health care provider for complete information about your health and treatment options. This information should not be used to decide whether or not to accept your health care providers advice, instructions or recommendations. Only your health care provider has the knowledge and training to provide advice that is right for you.  Copyright   Copyright © 2021 [employer] and its affiliates and/or licensors. All rights reserved.

## 2024-02-28 ENCOUNTER — OFFICE VISIT (OUTPATIENT)
Dept: URGENT CARE | Facility: CLINIC | Age: 24
End: 2024-02-28
Payer: COMMERCIAL

## 2024-02-28 VITALS
OXYGEN SATURATION: 99 % | HEIGHT: 68 IN | DIASTOLIC BLOOD PRESSURE: 78 MMHG | TEMPERATURE: 98 F | WEIGHT: 143 LBS | SYSTOLIC BLOOD PRESSURE: 128 MMHG | HEART RATE: 101 BPM | RESPIRATION RATE: 19 BRPM | BODY MASS INDEX: 21.67 KG/M2

## 2024-02-28 DIAGNOSIS — R52 GENERALIZED BODY ACHES: ICD-10-CM

## 2024-02-28 DIAGNOSIS — J06.9 VIRAL URI: Primary | ICD-10-CM

## 2024-02-28 LAB
CTP QC/QA: YES
MOLECULAR STREP A: NEGATIVE
POC MOLECULAR INFLUENZA A AGN: NEGATIVE
POC MOLECULAR INFLUENZA B AGN: NEGATIVE
SARS-COV-2 AG RESP QL IA.RAPID: NEGATIVE

## 2024-02-28 PROCEDURE — 87502 INFLUENZA DNA AMP PROBE: CPT | Mod: QW,S$GLB,, | Performed by: NURSE PRACTITIONER

## 2024-02-28 PROCEDURE — 99499 UNLISTED E&M SERVICE: CPT | Mod: S$GLB,,, | Performed by: NURSE PRACTITIONER

## 2024-02-28 PROCEDURE — 87811 SARS-COV-2 COVID19 W/OPTIC: CPT | Mod: QW,S$GLB,, | Performed by: NURSE PRACTITIONER

## 2024-02-28 PROCEDURE — 87651 STREP A DNA AMP PROBE: CPT | Mod: QW,S$GLB,, | Performed by: NURSE PRACTITIONER

## 2024-02-28 NOTE — PATIENT INSTRUCTIONS
Patient is able to take exams tomorrow.   All testing negative.     PLEASE READ YOUR DISCHARGE INSTRUCTIONS ENTIRELY AS IT CONTAINS IMPORTANT INFORMATION.      Please drink plenty of fluids.     Please get plenty of rest.     Please return here or go to the Emergency Department for any concerns or worsening of condition.    Take an over the counter antihistamine medication (allegra/Claritin/Zyrtec) of your choice as directed.     Try an over the counter decongestant like Mucinex D or Sudafed if you do not have a history of high blood pressure.  You buy this behind the pharmacy counter.     If you do have high blood pressure or cardiac history, it is safe to take Coricidin HBP for relief of sinus symptoms.     If not allergic, please take over the counter Tylenol (Acetaminophen) and/or Motrin (Ibuprofen) as directed for control of pain and/or fever.  Please follow up with your primary care doctor or specialist as needed.     Sore throat recommendations: Warm fluids, warm salt water gargles, throat lozenges, tea, honey, soup, rest, hydration.     Use over the counter flonase: one spray each nostril twice daily OR two sprays each nostril once daily.      If you smoke, please stop smoking.        Please return or see your primary care doctor if you develop new or worsening symptoms.      Please arrange follow up with your primary medical clinic as soon as possible. You must understand that you've received an Urgent Care treatment only and that you may be released before all of your medical problems are known or treated. You, the patient, will arrange for follow up as instructed. If your symptoms worsen or fail to improve you should go to the Emergency Room.

## 2024-02-28 NOTE — PROGRESS NOTES
"Subjective:      Patient ID: Hiwot Talbot is a 23 y.o. female.    Vitals:  height is 5' 8" (1.727 m) and weight is 64.9 kg (143 lb). Her oral temperature is 98 °F (36.7 °C). Her blood pressure is 128/78 and her pulse is 101. Her respiration is 19 and oxygen saturation is 99%.     Chief Complaint: Sore Throat (Student )    Pt c/o sore throat and runny nose for 2 days     Sore Throat   This is a new problem. The current episode started yesterday. The problem has been gradually worsening. Neither side of throat is experiencing more pain than the other. There has been no fever. The pain is at a severity of 4/10. Associated symptoms include trouble swallowing. Pertinent negatives include no congestion, coughing, diarrhea, drooling, ear discharge, ear pain, headaches, hoarse voice, plugged ear sensation, neck pain, shortness of breath, stridor, swollen glands or vomiting. Associated symptoms comments: Runny nose   . She has had no exposure to strep or mono. She has tried acetaminophen for the symptoms. The treatment provided mild relief.       HENT:  Positive for sore throat and trouble swallowing. Negative for ear pain, ear discharge, drooling and congestion.    Neck: Negative for neck pain.   Respiratory:  Negative for cough, shortness of breath and stridor.    Gastrointestinal:  Negative for vomiting and diarrhea.   Neurological:  Negative for headaches.      Objective:     Physical Exam   Constitutional: She is oriented to person, place, and time. She appears well-developed. She is cooperative.  Non-toxic appearance. She does not appear ill. No distress.   HENT:   Head: Normocephalic and atraumatic.   Ears:   Right Ear: Hearing, tympanic membrane, external ear and ear canal normal.   Left Ear: Hearing, tympanic membrane, external ear and ear canal normal.   Nose: Nose normal. No mucosal edema, rhinorrhea or nasal deformity. No epistaxis. Right sinus exhibits no maxillary sinus tenderness and no frontal sinus " tenderness. Left sinus exhibits no maxillary sinus tenderness and no frontal sinus tenderness.   Mouth/Throat: Uvula is midline and mucous membranes are normal. No trismus in the jaw. Normal dentition. No uvula swelling. Posterior oropharyngeal erythema present. No oropharyngeal exudate or posterior oropharyngeal edema.   Eyes: Conjunctivae and lids are normal. No scleral icterus.   Neck: Trachea normal and phonation normal. Neck supple. No edema present. No erythema present. No neck rigidity present.   Cardiovascular: Normal rate, regular rhythm, normal heart sounds and normal pulses.   Pulmonary/Chest: Effort normal and breath sounds normal. No respiratory distress. She has no decreased breath sounds. She has no rhonchi.   Abdominal: Normal appearance.   Musculoskeletal: Normal range of motion.         General: No deformity. Normal range of motion.   Neurological: She is alert and oriented to person, place, and time. She exhibits normal muscle tone. Coordination normal.   Skin: Skin is warm, dry, intact, not diaphoretic and not pale.   Psychiatric: Her speech is normal and behavior is normal. Judgment and thought content normal.   Nursing note and vitals reviewed.    Results for orders placed or performed in visit on 02/28/24   POCT Strep A, Molecular   Result Value Ref Range    Molecular Strep A, POC Negative Negative     Acceptable Yes    SARS Coronavirus 2 Antigen, POCT Manual Read   Result Value Ref Range    SARS Coronavirus 2 Antigen Negative Negative     Acceptable Yes    POCT Influenza A/B MOLECULAR   Result Value Ref Range    POC Molecular Influenza A Ag Negative Negative, Not Reported    POC Molecular Influenza B Ag Negative Negative, Not Reported     Acceptable Yes        Assessment:     1. Viral URI    2. Generalized body aches        Plan:       Viral URI  -     POCT Strep A, Molecular  -     SARS Coronavirus 2 Antigen, POCT Manual Read    Generalized body  aches  -     POCT Influenza A/B MOLECULAR                  Patient Instructions   Patient is able to take exams tomorrow.   All testing negative.     PLEASE READ YOUR DISCHARGE INSTRUCTIONS ENTIRELY AS IT CONTAINS IMPORTANT INFORMATION.      Please drink plenty of fluids.     Please get plenty of rest.     Please return here or go to the Emergency Department for any concerns or worsening of condition.    Take an over the counter antihistamine medication (allegra/Claritin/Zyrtec) of your choice as directed.     Try an over the counter decongestant like Mucinex D or Sudafed if you do not have a history of high blood pressure.  You buy this behind the pharmacy counter.     If you do have high blood pressure or cardiac history, it is safe to take Coricidin HBP for relief of sinus symptoms.     If not allergic, please take over the counter Tylenol (Acetaminophen) and/or Motrin (Ibuprofen) as directed for control of pain and/or fever.  Please follow up with your primary care doctor or specialist as needed.     Sore throat recommendations: Warm fluids, warm salt water gargles, throat lozenges, tea, honey, soup, rest, hydration.     Use over the counter flonase: one spray each nostril twice daily OR two sprays each nostril once daily.      If you smoke, please stop smoking.        Please return or see your primary care doctor if you develop new or worsening symptoms.      Please arrange follow up with your primary medical clinic as soon as possible. You must understand that you've received an Urgent Care treatment only and that you may be released before all of your medical problems are known or treated. You, the patient, will arrange for follow up as instructed. If your symptoms worsen or fail to improve you should go to the Emergency Room.

## 2024-07-30 ENCOUNTER — OFFICE VISIT (OUTPATIENT)
Dept: OBSTETRICS AND GYNECOLOGY | Facility: CLINIC | Age: 24
End: 2024-07-30

## 2024-07-30 VITALS
DIASTOLIC BLOOD PRESSURE: 78 MMHG | WEIGHT: 141.31 LBS | SYSTOLIC BLOOD PRESSURE: 104 MMHG | BODY MASS INDEX: 20.93 KG/M2 | HEIGHT: 69 IN

## 2024-07-30 DIAGNOSIS — Z30.9 ENCOUNTER FOR CONTRACEPTIVE MANAGEMENT, UNSPECIFIED TYPE: Primary | ICD-10-CM

## 2024-07-30 PROBLEM — F41.9 ANXIETY: Status: ACTIVE | Noted: 2018-06-07

## 2024-07-30 PROBLEM — G43.101 MIGRAINE WITH AURA AND WITH STATUS MIGRAINOSUS, NOT INTRACTABLE: Status: ACTIVE | Noted: 2018-06-07

## 2024-07-30 LAB
B-HCG UR QL: NEGATIVE
CTP QC/QA: YES

## 2024-07-30 PROCEDURE — 99999 PR PBB SHADOW E&M-EST. PATIENT-LVL III: CPT | Mod: PBBFAC,,, | Performed by: FAMILY MEDICINE

## 2024-07-30 PROCEDURE — 99999PBSHW POCT URINE PREGNANCY: Mod: PBBFAC,,,

## 2024-07-30 PROCEDURE — 99203 OFFICE O/P NEW LOW 30 MIN: CPT | Mod: S$PBB,,, | Performed by: FAMILY MEDICINE

## 2024-07-30 PROCEDURE — 99213 OFFICE O/P EST LOW 20 MIN: CPT | Mod: PBBFAC | Performed by: FAMILY MEDICINE

## 2024-07-30 PROCEDURE — 81025 URINE PREGNANCY TEST: CPT | Mod: PBBFAC | Performed by: FAMILY MEDICINE

## 2024-07-30 RX ORDER — NORETHINDRONE 0.35 MG/1
1 TABLET ORAL DAILY
Qty: 30 TABLET | Refills: 11 | Status: SHIPPED | OUTPATIENT
Start: 2024-07-30 | End: 2025-07-30

## 2024-07-30 NOTE — PROGRESS NOTES
CC: discuss contraception    HPI: Pt is a 23 y.o.  female who presents to discuss contraception. Has never been on any before. Hx of migraines with aura. SA male partner uses condoms. Cycle regular.  This is the extent of the patient's complaints at this time.       Past Medical History:   Diagnosis Date    Anemia     Migraine with aura     Vision abnormalities     pt wears contacts       History reviewed. No pertinent surgical history.    MEDICATIONS AND ALLERGIES:      Current Outpatient Medications:     ARAZLO 0.045 % Lotn, , Disp: , Rfl:     norethindrone (MICRONOR) 0.35 mg tablet, Take 1 tablet (0.35 mg total) by mouth once daily., Disp: 30 tablet, Rfl: 11    Review of patient's allergies indicates:  No Known Allergies    Family History   Problem Relation Name Age of Onset    Breast cancer Neg Hx      Colon cancer Neg Hx      Ovarian cancer Neg Hx         Social History     Socioeconomic History    Marital status: Single   Tobacco Use    Smoking status: Never     Passive exposure: Never    Smokeless tobacco: Never   Substance and Sexual Activity    Alcohol use: Yes     Comment: socially    Drug use: No    Sexual activity: Yes     Partners: Male     Birth control/protection: None       OB History    Para Term  AB Living   0 0 0 0 0 0   SAB IAB Ectopic Multiple Live Births   0 0 0 0 0        ROS:  GENERAL: Feeling well overall. Denies fever or chills.   REPRODUCTIVE: no abnormal bleeding      PE:   Physical Exam:   Constitutional: She is oriented to person, place, and time. She appears well-developed and well-nourished.        Pulmonary/Chest: Effort normal.                      Neurological: She is alert and oriented to person, place, and time.       Results for orders placed or performed in visit on 24   POCT Urine Pregnancy   Result Value Ref Range    POC Preg Test, Ur Negative Negative     Acceptable Yes          Diagnosis:  1. Encounter for contraceptive management,  unspecified type        Plan:     Orders Placed This Encounter    POCT Urine Pregnancy    norethindrone (MICRONOR) 0.35 mg tablet       Patient desires to begin contraception, risks, benefits and options discussed in detail.  The use of the oral contraceptive has been fully discussed with the patient. This includes the proper method to initiate (i.e. Sunday start after next normal menstrual onset) and continue the pills, the need for regular compliance to ensure adequate contraceptive effect and warnings about anticipated minor side effects such as breakthrough spotting, nausea, breast tenderness, weight changes, acne, headaches, etc. S The need for additional protection, such as a condom, to prevent exposure to sexually transmitted diseases has also been discussed- the patient has been clearly reminded that OCP's cannot protect her against diseases such as HIV and others. She verbalizes understanding and wishes to take the medication as prescribed.     She will schedule appt for pap did not want to do today

## 2024-11-13 ENCOUNTER — OFFICE VISIT (OUTPATIENT)
Dept: OBSTETRICS AND GYNECOLOGY | Facility: CLINIC | Age: 24
End: 2024-11-13

## 2024-11-13 VITALS
DIASTOLIC BLOOD PRESSURE: 82 MMHG | HEIGHT: 68 IN | SYSTOLIC BLOOD PRESSURE: 114 MMHG | WEIGHT: 141.13 LBS | BODY MASS INDEX: 21.39 KG/M2

## 2024-11-13 DIAGNOSIS — Z30.9 ENCOUNTER FOR CONTRACEPTIVE MANAGEMENT, UNSPECIFIED TYPE: Primary | ICD-10-CM

## 2024-11-13 PROCEDURE — 99499 UNLISTED E&M SERVICE: CPT | Mod: S$PBB,,, | Performed by: FAMILY MEDICINE

## 2024-11-13 PROCEDURE — 99999 PR PBB SHADOW E&M-EST. PATIENT-LVL III: CPT | Mod: PBBFAC,,, | Performed by: FAMILY MEDICINE

## 2024-11-13 PROCEDURE — 99213 OFFICE O/P EST LOW 20 MIN: CPT | Mod: PBBFAC | Performed by: FAMILY MEDICINE

## 2024-11-13 RX ORDER — NORETHINDRONE 0.35 MG/1
1 TABLET ORAL DAILY
Qty: 90 TABLET | Refills: 3 | Status: SHIPPED | OUTPATIENT
Start: 2024-11-13 | End: 2025-11-13

## 2024-11-13 NOTE — PROGRESS NOTES
Pt scheduled to discuss contraception. Previously put on POP but thought she had to be seen again 3 months later for refill. Doing well on it and would like to continue. Initially noticed decreased appetite but that is improving. Will refill to do 3 month packs at a time. Schedule WWE. Let me know if any issues before then.

## 2025-01-14 ENCOUNTER — OFFICE VISIT (OUTPATIENT)
Dept: URGENT CARE | Facility: CLINIC | Age: 25
End: 2025-01-14
Payer: COMMERCIAL

## 2025-01-14 VITALS
HEART RATE: 90 BPM | HEIGHT: 68 IN | SYSTOLIC BLOOD PRESSURE: 140 MMHG | BODY MASS INDEX: 21.37 KG/M2 | OXYGEN SATURATION: 98 % | RESPIRATION RATE: 18 BRPM | TEMPERATURE: 99 F | WEIGHT: 141 LBS | DIASTOLIC BLOOD PRESSURE: 66 MMHG

## 2025-01-14 DIAGNOSIS — K06.8 BLEEDING GUMS: Primary | ICD-10-CM

## 2025-01-14 DIAGNOSIS — S09.93XA DENTAL TRAUMA, INITIAL ENCOUNTER: ICD-10-CM

## 2025-01-14 PROCEDURE — 99499 UNLISTED E&M SERVICE: CPT | Mod: S$GLB,,, | Performed by: FAMILY MEDICINE

## 2025-01-14 RX ORDER — CHLORHEXIDINE GLUCONATE ORAL RINSE 1.2 MG/ML
15 SOLUTION DENTAL 2 TIMES DAILY
Qty: 420 ML | Refills: 0 | Status: SHIPPED | OUTPATIENT
Start: 2025-01-14 | End: 2025-01-28

## 2025-01-14 NOTE — PROGRESS NOTES
"Subjective:      Patient ID: Hiwot Talbot is a 24 y.o. female.    Vitals:  height is 5' 8" (1.727 m) and weight is 64 kg (141 lb). Her oral temperature is 99.4 °F (37.4 °C). Her blood pressure is 140/66 (abnormal) and her pulse is 90. Her respiration is 18 and oxygen saturation is 98%.     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 She has been able to tolerate PO, denies any fever, chills, facial pain or swelling, thinks she is due for dental cleaning, does not floss regularly. She is supposed to be getting wisdom teeth out soon. She was concerned because the bleeding still ongoing, denies hx of bleeding problems, or other pmh. Daily ocp. She denies any dizziness, fatigue, lightheadedness, cp or sob. She says she does have heavy cycles. Denies hx of easy bleeding/bruising.    Dental Pain   This is a new problem. The current episode started in the past 7 days. The pain is at a severity of 2/10. The pain is mild. Associated symptoms include oral bleeding. Pertinent negatives include no difficulty swallowing, facial pain, fever, sinus pressure or thermal sensitivity. She has tried NSAIDs for the symptoms. The treatment provided mild relief.       Constitution: Negative for activity change, appetite change, chills, sweating, fatigue and fever.   HENT:  Negative for sinus pressure.    Neurological:  Negative for dizziness and history of vertigo.   Hematologic/Lymphatic: Negative for easy bruising/bleeding and trouble clotting. Does not bruise/bleed easily.      Objective:     Physical Exam   Constitutional: She is oriented to person, place, and time. She appears well-developed.  Non-toxic appearance. She does not appear ill. No distress.   HENT:   Head: Normocephalic and atraumatic. "   Ears:   Right Ear: External ear normal.   Left Ear: External ear normal.   Nose: Nose normal.   Mouth/Throat: Oropharynx is clear and moist. She does not have dentures. Normal dentition. No dental abscesses, lacerations or dental caries.       Eyes: Conjunctivae, EOM and lids are normal. Pupils are equal, round, and reactive to light.   Neck: Trachea normal and phonation normal. Neck supple.   Musculoskeletal: Normal range of motion.         General: Normal range of motion.   Neurological: She is alert and oriented to person, place, and time.   Skin: Skin is warm, dry, intact and not diaphoretic.   Psychiatric: Her speech is normal and behavior is normal. Judgment and thought content normal.   Nursing note and vitals reviewed.      Assessment:     1. Bleeding gums    2. Dental trauma, initial encounter        Plan:     Reviewed can take a few days to heal, advised on holding pressure 10-15 min, ice pack, tylenol prn pain, we did review VWB pt denies hx of this, only other risk factor would be heavy cycles, she will ctm and f/u with gyn if continues for further w/u as indicated. We can try peridex, encouraged daily flossing f/u with concerns    Discussed results/diagnosis/plan with patient in clinic. Strict precautions given to patient to monitor for worsening signs and symptoms. Advised to follow up with PCP or specialist.    Explained side effects of medications prescribed with patient and informed him/her to discontinue use if he/she has any side effects and to inform UC or PCP if this occurs. All questions answered. Strict ED verses clinic return precautions stressed and given in depth. Advised if symptoms worsens of fail to improve he/she should go to the Emergency Room. Discharge and follow-up instructions given verbally/printed with the patient who expressed understanding and willingness to comply with my recommendations. Patient voiced understanding and in agreement with current treatment plan. Patient  exits the exam room in no acute distress. Conversant and engaged during discharge discussion, verbalized understanding.      Bleeding gums  -     chlorhexidine (PERIDEX) 0.12 % solution; Use as directed 15 mLs in the mouth or throat 2 (two) times daily. for 14 days  Dispense: 420 mL; Refill: 0    Dental trauma, initial encounter

## 2025-01-14 NOTE — PATIENT INSTRUCTIONS
General Discharge Instructions   PLEASE READ YOUR DISCHARGE INSTRUCTIONS ENTIRELY AS IT CONTAINS IMPORTANT INFORMATION.  If you were prescribed a narcotic or controlled medication, do not drive or operate heavy equipment or machinery while taking these medications.  If you were prescribed antibiotics, please take them to completion.  You must understand that you've received an Urgent Care treatment only and that you may be released before all your medical problems are known or treated. You, the patient, will arrange for follow up care as instructed.    OVER THE COUNTER RECOMMENDATIONS/SUGGESTIONS.    Make sure to stay well hydrated.        Tylenol up to 4,000 mg a day is safe for short periods and can be used for body aches, pain, and fever. However in high doses and prolonged use it can cause liver irritation.    Ibuprofen is a non-steroidal anti-inflammatory that can be used for body aches, pain, and fever.However it can also cause stomach irritation if over used.     Follow up with your PCP or specialty clinic as instructed in the next 2-3 days if not improved or as needed. You can call [phone] to schedule an appointment with appropriate provider.      If you condition worsens, we recommend that you receive another evaluation at the emergency room immediately or contact your primary medical clinic's after hours call service to discuss your concerns.      Please return here or go to the Emergency Department for any concerns or worsening condition.   You can also call [phone] to schedule an appointment with the appropriate provider.    Please return here or go to the Emergency Department for any concerns or worsening of condition.    Thank you for choosing [facility]!    Our goal in the Urgent Care is to always provide outstanding medical care. You may receive a survey by mail or e-mail in the next week regarding your experience today. We would greatly appreciate you completing and returning  the survey. Your feedback provides us with a way to recognize our staff who provide very good care, and it helps us learn how to improve when your experience was below our aspiration of excellence.      We appreciate you trusting us with your medical care. We hope you feel better soon. We will be happy to take care of you for all of your future medical needs.    Sincerely,    MIKHAIL De Oliveira

## 2025-02-13 ENCOUNTER — PATIENT MESSAGE (OUTPATIENT)
Dept: OBSTETRICS AND GYNECOLOGY | Facility: CLINIC | Age: 25
End: 2025-02-13
Payer: COMMERCIAL

## 2025-02-19 ENCOUNTER — OFFICE VISIT (OUTPATIENT)
Dept: OBSTETRICS AND GYNECOLOGY | Facility: CLINIC | Age: 25
End: 2025-02-19
Payer: COMMERCIAL

## 2025-02-19 DIAGNOSIS — Z30.9 ENCOUNTER FOR CONTRACEPTIVE MANAGEMENT, UNSPECIFIED TYPE: Primary | ICD-10-CM

## 2025-02-19 RX ORDER — NORETHINDRONE 0.35 MG/1
1 TABLET ORAL DAILY
Qty: 30 TABLET | Refills: 11 | Status: SHIPPED | OUTPATIENT
Start: 2025-02-19 | End: 2026-02-19

## 2025-02-19 NOTE — PROGRESS NOTES
Visit type: audiovisual  Total time spent on encounter: 15 min    Each patient to whom he or she provides medical services by telemedicine is:  (1) informed of the relationship between the physician and patient and the respective role of any other health care provider with respect to management of the patient; and (2) notified that he or she may decline to receive medical services by telemedicine and may withdraw from such care at any time.      Chief Complaint: contraception     HPI:      Hiwot Talbot is a 24 y.o.  who presents to discuss contraception. Would like to discuss changing from POP to nexplanon. Forgetful taking pill.  Has migraines with aura. SA male partner, uses condoms but not consistently.      Patient does not have regular monthly menses on POP, sometimes will miss cycle. No LMP recorded.      ROS:     GENERAL: Denies fevers or chills. Feeling well overall.   GYNECOLOGIC: no aub      Physical Exam:      PHYSICAL EXAM:  There were no vitals taken for this visit.  There is no height or weight on file to calculate BMI.     APPEARANCE: Well nourished, well developed, in no acute distress.  Exam deferred due to televisit      Assessment/Plan:     Encounter for contraceptive management, unspecified type  -     norethindrone (MICRONOR) 0.35 mg tablet; Take 1 tablet (0.35 mg total) by mouth once daily.  Dispense: 30 tablet; Refill: 11  -     Device Authorization Order      Progestin only forms of birth control discussed. Auth placed for nexplanon, discussed condoms with intercourse and pill consistently until placement.   Discussed annual/pap needed as well

## 2025-02-27 ENCOUNTER — TELEPHONE (OUTPATIENT)
Dept: OBSTETRICS AND GYNECOLOGY | Facility: CLINIC | Age: 25
End: 2025-02-27
Payer: COMMERCIAL

## 2025-02-27 NOTE — TELEPHONE ENCOUNTER
Attempted to reach out to pt to schedule nexplanon insertion appt. No answer lvm    ----- Message from Juaquin Ross NP sent at 2/27/2025 10:51 AM CST -----  Nexplanon approved, please schedule. Does not need to be on cycle is on birth control pill

## 2025-04-07 ENCOUNTER — OFFICE VISIT (OUTPATIENT)
Dept: PRIMARY CARE CLINIC | Facility: CLINIC | Age: 25
End: 2025-04-07
Payer: COMMERCIAL

## 2025-04-07 VITALS
HEIGHT: 68 IN | SYSTOLIC BLOOD PRESSURE: 102 MMHG | DIASTOLIC BLOOD PRESSURE: 60 MMHG | TEMPERATURE: 98 F | RESPIRATION RATE: 16 BRPM | WEIGHT: 145.75 LBS | HEART RATE: 83 BPM | BODY MASS INDEX: 22.09 KG/M2 | OXYGEN SATURATION: 98 %

## 2025-04-07 DIAGNOSIS — J32.9 SINUSITIS, UNSPECIFIED CHRONICITY, UNSPECIFIED LOCATION: ICD-10-CM

## 2025-04-07 DIAGNOSIS — R09.82 POST-NASAL DRIP: ICD-10-CM

## 2025-04-07 DIAGNOSIS — J02.9 PHARYNGITIS, UNSPECIFIED ETIOLOGY: Primary | ICD-10-CM

## 2025-04-07 PROCEDURE — 1159F MED LIST DOCD IN RCRD: CPT | Mod: CPTII,S$GLB,, | Performed by: NURSE PRACTITIONER

## 2025-04-07 PROCEDURE — 99214 OFFICE O/P EST MOD 30 MIN: CPT | Mod: S$GLB,,, | Performed by: NURSE PRACTITIONER

## 2025-04-07 PROCEDURE — 3074F SYST BP LT 130 MM HG: CPT | Mod: CPTII,S$GLB,, | Performed by: NURSE PRACTITIONER

## 2025-04-07 PROCEDURE — 3008F BODY MASS INDEX DOCD: CPT | Mod: CPTII,S$GLB,, | Performed by: NURSE PRACTITIONER

## 2025-04-07 PROCEDURE — 3078F DIAST BP <80 MM HG: CPT | Mod: CPTII,S$GLB,, | Performed by: NURSE PRACTITIONER

## 2025-04-07 PROCEDURE — 99999 PR PBB SHADOW E&M-EST. PATIENT-LVL III: CPT | Mod: PBBFAC,,, | Performed by: NURSE PRACTITIONER

## 2025-04-07 RX ORDER — AMOXICILLIN AND CLAVULANATE POTASSIUM 875; 125 MG/1; MG/1
1 TABLET, FILM COATED ORAL 2 TIMES DAILY
Qty: 14 TABLET | Refills: 0 | Status: SHIPPED | OUTPATIENT
Start: 2025-04-07

## 2025-04-07 NOTE — PROGRESS NOTES
Assessment:       1. Pharyngitis, unspecified etiology    2. Sinusitis, unspecified chronicity, unspecified location    3. Post-nasal drip         Plan:       Pharyngitis, unspecified etiology    Sinusitis, unspecified chronicity, unspecified location  -     amoxicillin-clavulanate 875-125mg (AUGMENTIN) 875-125 mg per tablet; Take 1 tablet by mouth 2 (two) times daily.  Dispense: 14 tablet; Refill: 0    Post-nasal drip      Assessment & Plan    IMPRESSION:  - Considered multiple potential causes for persistent sore throat, including strep, mono, postnasal drip, allergies, sinus infection, and reflux.  - Ruled out strep based on negative rapid strep test performed in office, though acknowledging possibility of false negative.  - Will treat empirically for possible sinus infection given duration of symptoms.  - Discussed that sore throat lasting this long is unusual and requires treatment.    ACUTE PHARYNGITIS:   Evaluated the patient's sore throat, which has persisted for about a month with pain when swallowing and slight swelling.   Noted that the pain is worse at night and in the morning, rated 7-8 out of 10.   Observed a red throat and swollen tonsils during exam.   Performed a strep test, which was negative.   Considered strep throat and mononucleosis as potential causes, but ruled out mono based on the patient's symptoms.   Prescribed Augmentin (amoxicillin/clavulanate) 875mg/125mg twice daily for 7 days to treat potential strep throat.   Instructed the patient to report if symptoms persist or worsen after completing the antibiotic course.    ACUTE SINUSITIS:   Noted facial congestion and mucus production in the morning.   Observed tenderness in the sinus area during exam.   Considered postnasal drip as a possible cause of sore throat.   Prescribed Augmentin (amoxicillin/clavulanate) 875mg/125mg twice daily for 7 days to treat potential sinus infection.   Advised the patient on proper nasal hygiene and use of  saline nasal rinses.    MIGRAINE:   Noted the patient's history of migraines and recent recurrence of headaches coinciding with throat pain.   Recommend using acetaminophen for pain relief.   Advised the patient to maintain a headache diary to identify potential triggers.   Discussed lifestyle modifications to help prevent migraines.    PERSONAL HISTORY OF INFECTIOUS DISEASES:   Noted the patient's history of mononucleosis in childhood.   Considered this history while assessing current symptoms and formulating treatment plan.    LONG-TERM USE OF HORMONAL CONTRACEPTIVES:   Noted that the patient is currently using progestin-only birth control.   Checked for potential interactions between prescribed antibiotic and birth control.   Advised using withdrawal method as an additional precaution during antibiotic treatment and for 7 days after completing the course.   Recommend scheduling a follow-up visit to discuss long-term contraceptive options if needed.       Medication List with Changes/Refills   New Medications    AMOXICILLIN-CLAVULANATE 875-125MG (AUGMENTIN) 875-125 MG PER TABLET    Take 1 tablet by mouth 2 (two) times daily.   Current Medications    ARAZLO 0.045 % LOTN        NORETHINDRONE (MICRONOR) 0.35 MG TABLET    Take 1 tablet (0.35 mg total) by mouth once daily.         Subjective:    Patient ID: Hiwot Talbot is a 24 y.o. female.  Chief Complaint: Sore Throat    History of Present Illness    CHIEF COMPLAINT:  Hiwot presents today for sore throat lasting one month    HISTORY OF PRESENT ILLNESS:  She reports a persistent sore throat for approximately one month with pain worse when swallowing and sensation of throat swelling. Pain severity fluctuates throughout the day, most intense at bedtime (7-8/10). Symptoms had improved one week ago but worsened again over the past four days. She denies difficulty swallowing or fever. Associated symptoms include occasional cough, facial congestion with morning mucus, and  "return of headaches. She previously took cold and flu medicine for initial symptoms.    MEDICAL HISTORY:  She has a history of migraines (none for years until recent throat pain onset), allergies (previously managed with Zyrtec, now discontinued), and mononucleosis during childhood.    MEDICATIONS:  She is currently on progestin-only birth control.       Review of Systems   Constitutional:  Negative for chills and fever.   HENT:  Positive for sore throat. Negative for ear pain, nosebleeds, sinus pressure and trouble swallowing.    Respiratory:  Negative for cough and shortness of breath.    Cardiovascular:  Negative for chest pain and palpitations.   Gastrointestinal:  Negative for diarrhea, nausea and vomiting.   Genitourinary: Negative.    Psychiatric/Behavioral:  Negative for dysphoric mood and sleep disturbance. The patient is not nervous/anxious.        Objective:      Vitals:    04/07/25 1208   BP: 102/60   BP Location: Left arm   Patient Position: Sitting   Pulse: 83   Resp: 16   Temp: 97.6 °F (36.4 °C)   TempSrc: Temporal   SpO2: 98%   Weight: 66.1 kg (145 lb 11.6 oz)   Height: 5' 8" (1.727 m)     BP Readings from Last 5 Encounters:   04/07/25 102/60   01/14/25 (!) 140/66   11/13/24 114/82   07/30/24 104/78   02/28/24 128/78     Wt Readings from Last 5 Encounters:   04/07/25 66.1 kg (145 lb 11.6 oz)   01/14/25 64 kg (141 lb)   11/13/24 64 kg (141 lb 1.5 oz)   07/30/24 64.1 kg (141 lb 5 oz)   02/28/24 64.9 kg (143 lb)     Physical Exam  Constitutional:       General: She is not in acute distress.     Appearance: Normal appearance. She is not ill-appearing.   HENT:      Head: Normocephalic.      Mouth/Throat:      Mouth: Mucous membranes are moist.      Pharynx: Posterior oropharyngeal erythema present. No pharyngeal swelling or oropharyngeal exudate.      Tonsils: No tonsillar exudate.   Eyes:      Conjunctiva/sclera:      Right eye: Right conjunctiva is not injected.      Left eye: Left conjunctiva is not " injected.   Cardiovascular:      Rate and Rhythm: Normal rate and regular rhythm.      Pulses:           Radial pulses are 1+ on the right side and 1+ on the left side.      Heart sounds: No murmur heard.     No systolic murmur is present.      No diastolic murmur is present.   Pulmonary:      Effort: No tachypnea or bradypnea.      Breath sounds: Normal breath sounds. No decreased breath sounds, wheezing, rhonchi or rales.   Abdominal:      General: There is no distension.   Musculoskeletal:      Right lower leg: No edema.      Left lower leg: No edema.   Skin:     General: Skin is warm and dry.   Neurological:      Mental Status: She is alert.   Psychiatric:         Attention and Perception: Attention normal.         Speech: Speech normal.         Behavior: Behavior normal.           Lab Results   Component Value Date    WBC 5.87 05/18/2022    HGB 13.8 05/18/2022    HCT 42.1 05/18/2022     (L) 05/18/2022    CHOL 119 03/28/2022    TRIG 35 03/28/2022    HDL 55 03/28/2022    ALT 11 03/28/2022    AST 17 03/28/2022     03/28/2022    K 4.6 03/28/2022     03/28/2022    CREATININE 0.64 03/28/2022    BUN 14 03/28/2022    CO2 27 03/28/2022    TSH 1.11 03/28/2022      This note was generated with the assistance of ambient listening technology. Verbal consent was obtained by the patient and accompanying visitor(s) for the recording of patient appointment to facilitate this note. I attest to having reviewed and edited the generated note for accuracy, though some syntax or spelling errors may persist. Please contact the author of this note for any clarification.